# Patient Record
Sex: MALE | Race: BLACK OR AFRICAN AMERICAN
[De-identification: names, ages, dates, MRNs, and addresses within clinical notes are randomized per-mention and may not be internally consistent; named-entity substitution may affect disease eponyms.]

---

## 2022-04-10 ENCOUNTER — HOSPITAL ENCOUNTER (EMERGENCY)
Dept: HOSPITAL 97 - ER | Age: 27
Discharge: HOME | End: 2022-04-10
Payer: SELF-PAY

## 2022-04-10 VITALS — SYSTOLIC BLOOD PRESSURE: 109 MMHG | DIASTOLIC BLOOD PRESSURE: 50 MMHG | OXYGEN SATURATION: 96 %

## 2022-04-10 VITALS — TEMPERATURE: 97.5 F

## 2022-04-10 DIAGNOSIS — R11.2: ICD-10-CM

## 2022-04-10 DIAGNOSIS — Z91.048: ICD-10-CM

## 2022-04-10 DIAGNOSIS — Z91.030: ICD-10-CM

## 2022-04-10 DIAGNOSIS — Z91.013: ICD-10-CM

## 2022-04-10 DIAGNOSIS — A08.39: Primary | ICD-10-CM

## 2022-04-10 DIAGNOSIS — Z20.822: ICD-10-CM

## 2022-04-10 LAB
ALBUMIN SERPL BCP-MCNC: 4 G/DL (ref 3.4–5)
ALP SERPL-CCNC: 75 U/L (ref 45–117)
ALT SERPL W P-5'-P-CCNC: 128 U/L (ref 12–78)
AST SERPL W P-5'-P-CCNC: 74 U/L (ref 15–37)
BUN BLD-MCNC: 13 MG/DL (ref 7–18)
GLUCOSE SERPLBLD-MCNC: 153 MG/DL (ref 74–106)
HCT VFR BLD CALC: 48.6 % (ref 39.6–49)
LIPASE SERPL-CCNC: 874 U/L (ref 73–393)
LYMPHOCYTES # SPEC AUTO: 2.5 K/UL (ref 0.7–4.9)
PMV BLD: 7.5 FL (ref 7.6–11.3)
POTASSIUM SERPL-SCNC: 3.7 MMOL/L (ref 3.5–5.1)
RBC # BLD: 5.78 M/UL (ref 4.33–5.43)

## 2022-04-10 PROCEDURE — 85025 COMPLETE CBC W/AUTO DIFF WBC: CPT

## 2022-04-10 PROCEDURE — 74176 CT ABD & PELVIS W/O CONTRAST: CPT

## 2022-04-10 PROCEDURE — 80053 COMPREHEN METABOLIC PANEL: CPT

## 2022-04-10 PROCEDURE — 99284 EMERGENCY DEPT VISIT MOD MDM: CPT

## 2022-04-10 PROCEDURE — 93005 ELECTROCARDIOGRAM TRACING: CPT

## 2022-04-10 PROCEDURE — 83690 ASSAY OF LIPASE: CPT

## 2022-04-10 PROCEDURE — 36415 COLL VENOUS BLD VENIPUNCTURE: CPT

## 2022-04-10 PROCEDURE — 76705 ECHO EXAM OF ABDOMEN: CPT

## 2022-04-10 NOTE — ER
Nurse's Notes                                                                                     

 [facility]                                                                 

Name: James Cruz Jr                                                                              

Age: 27 yrs                                                                                       

Sex: Male                                                                                         

: 1995                                                                                   

MRN: [id number]                                                                                   

Arrival Date: 04/10/2022                                                                          

Time: 11:06                                                                                       

Account#: [id number]                                                                            

Bed 19                                                                                            

Private MD:                                                                                       

Diagnosis: Nausea with vomiting, unspecified;Epigastric abdominal tenderness;Other viral enteritis

                                                                                                  

Presentation:                                                                                     

04/10                                                                                             

11:10 Chief complaint: EMS states: Sudden onset of severe epigastric discomfort just prior to ss  

      arrival. Pt belched en route to ED and reports that his pain is significantly better,       

      2/10. Coronavirus screen: Client denies travel out of the [place] in the last 14 days.         

      Ebola Screen: Patient denies exposure to infectious person. Patient denies travel to an     

      Ebola-affected area in the 21 days before illness onset. Initial Sepsis Screen: Does        

      the patient meet any 2 criteria? No. Patient's initial sepsis screen is negative. Does      

      the patient have a suspected source of infection? No. Patient's initial sepsis screen       

      is negative. Risk Assessment: Do you want to hurt yourself or someone else? Patient         

      reports no desire to harm self or others. Onset of symptoms was April 10, 2022.             

11:10 Method Of Arrival: EMS: [facility]                                                       ss  

11:10 Acuity: JAQUAN 3                                                                           ss  

                                                                                                  

Historical:                                                                                       

- Allergies:                                                                                      

11:12 Bees;                                                                                   ss  

11:12 Iodine;                                                                                 ss  

11:12 SHELLFISH;                                                                              ss  

- Home Meds:                                                                                      

11:12 None [Active];                                                                          ss  

- PMHx:                                                                                           

11:12 Asthma;                                                                                 ss  

- PSHx:                                                                                           

11:12 None;                                                                                   ss  

                                                                                                  

- Immunization history:: Client reports having NOT received the Covid vaccine.                    

- Social history:: Smoking status: Reported history of juuling and/or vaping.                     

- Family history:: not pertinent.                                                                 

                                                                                                  

                                                                                                  

Screenin:33 Abuse screen: Denies threats or abuse. Denies injuries from another. Nutritional        ww  

      screening: No deficits noted. Tuberculosis screening: No symptoms or risk factors           

      identified. Fall Risk None identified.                                                      

                                                                                                  

Assessment:                                                                                       

11:32 General: Appears in no apparent distress. uncomfortable, Behavior is cooperative. Pain: ww  

      Complains of pain in epigastric area, right upper quadrant and left upper quadrant.         

      Neuro: Level of Consciousness is awake, alert, obeys commands, Oriented to person,          

      place, time, situation, Moves all extremities. Speech is normal. Cardiovascular:            

      Capillary refill < 3 seconds Patient's skin is warm and dry. Respiratory: Airway is         

      patent Respiratory effort is even, unlabored, Respiratory pattern is regular,               

      symmetrical. GI: Abdomen is non-distended, Reports indigestion, nausea. : No signs        

      and/or symptoms were reported regarding the genitourinary system. EENT: No signs and/or     

      symptoms were reported regarding the EENT system. Derm: No signs and/or symptoms            

      reported regarding the dermatologic system. Skin is intact, is healthy with good            

      turgor, Skin is pink, warm \T\ dry.                                                         

11:55 General: Appears uncomfortable, Behavior is anxious, crying. Neuro:. Cardiovascular:.   ww  

      Derm: Skin is diaphoretic.                                                                  

12:30 Reassessment: Patient and/or family updated on plan of care and expected duration. Pain ww  

      level reassessed. General: Appears in no apparent distress. Behavior is sleeping.           

13:19 Reassessment: Dr. Galindo at bedside reassessing patient at this time and discussing   ss  

      results/ plan of care.                                                                      

13:50 Reassessment: Patient appears in no apparent distress at this time. Patient and/or      ww  

      family updated on plan of care and expected duration. Pain level reassessed. Patient is     

      alert, oriented x 3, equal unlabored respirations, skin warm/dry/pink. General: Appears     

      in no apparent distress. comfortable, Behavior is calm, cooperative. Respiratory:           

      Airway is patent Respiratory effort is even, unlabored, Respiratory pattern is regular,     

      symmetrical.                                                                                

14:20 Reassessment: Patient appears in no apparent distress at this time. No changes from     ww  

      previously documented assessment. Patient and/or family updated on plan of care and         

      expected duration. Pain level reassessed. Patient is alert, oriented x 3, equal             

      unlabored respirations, skin warm/dry/pink.                                                 

                                                                                                  

Vital Signs:                                                                                      

11:10  / 79; Pulse 60; Resp 15; Temp 97.5(TE); Pulse Ox 97% on R/A; Height 6 ft. 1 in.  ss  

      (185.42 cm); Pain 2/10;                                                                     

13:45  / 49; Pulse 58; Resp 16; Pulse Ox 97% on R/A;                                    ww  

14:30  / 49; Pulse 76; Resp 16; Pulse Ox 98% on R/A;                                    ww  

15:03  / 50; Pulse 56; Resp 16; Pulse Ox 96% on R/A;                                    ww  

                                                                                                  

ED Course:                                                                                        

11:06 Patient arrived in ED.                                                                  ds1 

11:06 Anthony Galindo MD is Attending Physician.                                             rosa 

11:12 Triage completed.                                                                       ss  

11:12 Kerri Kirkpatrick, RN is Primary Nurse.                                                     ww  

11:12 Arm band placed on right wrist.                                                         ss  

11:31 Abdomen In Process Unspecified.                                                         EDMS

11:33 Patient has correct armband on for positive identification. Bed in low position. Call   ww  

      light in reach. Side rails up X 1. Adult w/ patient. Pulse ox on. NIBP on.                  

11:33 Maintain EMS IV. Dressing intact. Good blood return noted. Site clean \T\ dry. Gauge \T\    ww

      site: 20g left ac.                                                                          

12:10 Inserted saline lock: 20 gauge in right antecubital area, using aseptic technique.      ww  

13:10 US Abdomen Limited In Process Unspecified.                                              EDMS

13:31 Isiah Florez MD is Referral Physician.                                                  rosa 

15:04 No provider procedures requiring assistance completed. IV discontinued, bleeding        ww  

      controlled, No redness/swelling at site. Pressure dressing applied.                         

                                                                                                  

Administered Medications:                                                                         

11:15 Drug: NS 0.9% 1000 ml Route: IV; Rate: 1 bolus; Site: left antecubital;                 ww  

11:15 Drug: morphine 2 mg Route: IVP; Site: left antecubital;                                 ww  

11:18 Drug: Zofran (Ondansetron) 4 mg Route: IVP; Site: left antecubital;                     ww  

11:21 Drug: Pepcid (famotidine) 20 mg Route: IVP; Site: left antecubital;                     ww  

11:43 Drug: Dilaudid (HYDROmorphone) 1 mg Route: IVP; Site: left antecubital;                 jh6 

12:18 Drug: Reglan (metoCLOPramide) 10 mg Route: IVP; Site: right antecubital;                ww  

12:24 Drug: Ativan (LORazepam) 1 mg Route: IVP; Site: right antecubital;                      ww  

13:30 Drug: NS 0.9% 1000 ml Route: IV; Rate: 1 bolus; Site: right antecubital;                ww  

                                                                                                  

                                                                                                  

Outcome:                                                                                          

13:34 Discharge ordered by MD.                                                                rosa 

15:05 Patient left the ED.                                                                    ww  

                                                                                                  

Signatures:                                                                                       

Dispatcher [employer]                           EDAnthony Arce MD MD cha Sanford, Demi                                ds1                                                  

Maylin Khalil, RN                      RN   ss                                                   

Karina Mcdaniel, RN                   RN   jh6                                                  

Kerri Kirkpatrick, RN                       RN   ww                                                   

                                                                                                  

**************************************************************************************************

## 2022-04-10 NOTE — RAD REPORT
EXAM DESCRIPTION:  CTAbdomen   Pelvis Wo Contrast - 4/10/2022 11:30 am

 

CLINICAL HISTORY:

ABD PAIN

 

COMPARISON:  Abdomen   Pelvis W Contrast dated 3/16/2022; Abdomen   Pelvis W Contrast dated 2/13/2019
; Abdomen   Pelvis W Contrast dated 3/7/2017; CT ABD PELVIS W CONTRAST dated 5/23/2014No comparisons

 

TECHNIQUE:  CT of the abdomen and pelvis was performed.

 

All CT scans are performed using dose optimization technique as appropriate and may include automated
 exposure control or mA/KV adjustment according to patient size.

 

FINDINGS:  Lower chest: No acute abnormality.

Liver: No acute abnormality or suspicious lesions.

Biliary: No biliary ductal dilatation.

Stomach: No significant focal abnormality.

Duodenum: No significant focal abnormality.

Pancreas: No significant abnormality.

Spleen: No significant abnormality.

Adrenal: No suspicious lesions.

Kidney/ureter: No hydronephrosis. No renal calculi.

Retroperitoneum: No retroperitoneal adenopathy.

Vascular: No aneurysm.

Bowel: Multiple air-fluid levels are present within the small bowel. No transition point to suggest b
owel obstruction this time..

Peritoneum: Mesenteric edema cyst with a small bowel. Prominent mesenteric lymph nodes.

Bladder: Grossly unremarkable.

Reproductive: No adnexal masses.

Bones: No acute fracture.

Other: n/a

 

IMPRESSION:  Small air-fluid levels the small bowel and mesenteric edema without discrete transition 
point likely reflecting an enteritis. No bowel obstruction. Normal appendix.

## 2022-04-10 NOTE — EDPHYS
Physician Documentation                                                                           

 [facility]                                                                 

Name: James Cruz Jr                                                                              

Age: 27 yrs                                                                                       

Sex: Male                                                                                         

: 1995                                                                                   

MRN: [id number]                                                                                   

Arrival Date: 04/10/2022                                                                          

Time: 11:06                                                                                       

Account#: [id number]                                                                            

Bed 19                                                                                            

Private MD:                                                                                       

ED Physician Anthony Galindo                                                                      

HPI:                                                                                              

04/10                                                                                             

11:14 This 27 yrs old Black Male presents to ER via EMS with complaints of upper abdominal    rosa 

      pain this morning.                                                                          

11:14 The patient presents with abdominal pain in the epigastric area, in the upper abdomen.  rosa 

      Onset: The symptoms/episode began/occurred this morning, today. The patient presents to     

      the emergency department with nausea, that is moderate. Onset: The symptoms/episode         

      began/occurred this morning. Possible causes: unknown. The symptoms are aggravated by       

      nothing. The symptoms are alleviated by nothing. The symptoms do not radiate.               

      Associated signs and symptoms: The patient has no apparent associated signs or              

      symptoms. Associated signs and symptoms: Pertinent positives: nausea and vomiting.          

      Modifying factors: The symptoms are alleviated by nothing, the symptoms are aggravated      

      by nothing.                                                                                 

                                                                                                  

Historical:                                                                                       

- Allergies:                                                                                      

11:12 Bees;                                                                                   ss  

11:12 Iodine;                                                                                 ss  

11:12 SHELLFISH;                                                                              ss  

- Home Meds:                                                                                      

11:12 None [Active];                                                                          ss  

- PMHx:                                                                                           

11:12 Asthma;                                                                                 ss  

- PSHx:                                                                                           

11:12 None;                                                                                   ss  

                                                                                                  

- Immunization history:: Client reports having NOT received the Covid vaccine.                    

- Social history:: Smoking status: Reported history of juuling and/or vaping.                     

- Family history:: not pertinent.                                                                 

                                                                                                  

                                                                                                  

ROS:                                                                                              

11:14 Constitutional: Negative for fever, chills, and weight loss, Eyes: Negative for injury, rosa 

      pain, redness, and discharge, ENT: Negative for injury, pain, and discharge, Neck:          

      Negative for injury, pain, and swelling, Cardiovascular: Negative for chest pain,           

      palpitations, and edema, Respiratory: Negative for shortness of breath, cough,              

      wheezing, and pleuritic chest pain, Back: Negative for injury and pain, : Negative        

      for injury, bleeding, discharge, and swelling, MS/Extremity: Negative for injury and        

      deformity, Skin: Negative for injury, rash, and discoloration, Neuro: Negative for          

      headache, weakness, numbness, tingling, and seizure, Psych: Negative for depression,        

      anxiety, suicide ideation, homicidal ideation, and hallucinations, Allergy/Immunology:      

      Negative for hives, rash, and allergies, Endocrine: Negative for neck swelling,             

      polydipsia, polyuria, polyphagia, and marked weight changes, Hematologic/Lymphatic:         

      Negative for swollen nodes, abnormal bleeding, and unusual bruising.                        

11:14 Abdomen/GI: Positive for abdominal pain, nausea and vomiting, vomiting, of the              

      epigastric area, right upper quadrant and left upper quadrant.                              

                                                                                                  

Exam:                                                                                             

11:14 Constitutional:  This is a well developed, well nourished patient who is awake, alert,  rosa 

      and in no acute distress. Head/Face:  Normocephalic, atraumatic. Eyes:  Pupils equal        

      round and reactive to light, extra-ocular motions intact.  Lids and lashes normal.          

      Conjunctiva and sclera are non-icteric and not injected.  Cornea within normal limits.      

      Periorbital areas with no swelling, redness, or edema. ENT:  Nares patent. No nasal         

      discharge, no septal abnormalities noted.  Tympanic membranes are normal and external       

      auditory canals are clear.  Oropharynx with no redness, swelling, or masses, exudates,      

      or evidence of obstruction, uvula midline.  Mucous membranes moist. Neck:  Trachea          

      midline, no thyromegaly or masses palpated, and no cervical lymphadenopathy.  Supple,       

      full range of motion without nuchal rigidity, or vertebral point tenderness.  No            

      Meningismus. Chest/axilla:  Normal chest wall appearance and motion.  Nontender with no     

      deformity.  No lesions are appreciated. Cardiovascular:  Regular rate and rhythm with a     

      normal S1 and S2.  No gallops, murmurs, or rubs.  Normal PMI, no JVD.  No pulse             

      deficits. Respiratory:  Lungs have equal breath sounds bilaterally, clear to                

      auscultation and percussion.  No rales, rhonchi or wheezes noted.  No increased work of     

      breathing, no retractions or nasal flaring. Back:  No spinal tenderness.  No                

      costovertebral tenderness.  Full range of motion. Male :  Normal genitalia with no        

      discharge or lesions. Skin:  Warm, dry with normal turgor.  Normal color with no            

      rashes, no lesions, and no evidence of cellulitis. MS/ Extremity:  Pulses equal, no         

      cyanosis.  Neurovascular intact.  Full, normal range of motion. Neuro:  Awake and           

      alert, GCS 15, oriented to person, place, time, and situation.  Cranial nerves II-XII       

      grossly intact.  Motor strength 5/5 in all extremities.  Sensory grossly intact.            

      Cerebellar exam normal.  Normal gait. Psych:  Awake, alert, with orientation to person,     

      place and time.  Behavior, mood, and affect are within normal limits.                       

11:14 Respiratory: mild respiratory distress is noted,  Respirations: normal, no acute            

      changes, Breath sounds: are clear throughout.                                               

11:14 Abdomen/GI: Inspection: abdomen appears normal, Bowel sounds: normal, Palpation:            

      moderate abdominal tenderness, in the epigastric area and left upper quadrant, Liver:       

      no appreciated palpable abnormalities, Hernia: not appreciated.                             

12:16 ECG was reviewed by the Attending Physician.                                            [facility] 

                                                                                                  

Vital Signs:                                                                                      

11:10  / 79; Pulse 60; Resp 15; Temp 97.5(TE); Pulse Ox 97% on R/A; Height 6 ft. 1 in.  ss  

      (185.42 cm); Pain 2/10;                                                                     

13:45  / 49; Pulse 58; Resp 16; Pulse Ox 97% on R/A;                                    ww  

14:30  / 49; Pulse 76; Resp 16; Pulse Ox 98% on R/A;                                    ww  

15:03  / 50; Pulse 56; Resp 16; Pulse Ox 96% on R/A;                                    ww  

                                                                                                  

MDM:                                                                                              

11:07 Patient medically screened.                                                             [facility] 

11:19 Differential diagnosis: Nonspecific abd pain, gastritis, diverticulitis, viral          rosa 

      gastroenteritis, gastroenteritis, cholecystitis, Cholelithiasis, diverticulitis,            

      non-specific abd pain, pancreatitis, Peptic Ulcer Disease, urinary tract infection.         

      Data reviewed: vital signs, nurses notes, lab test result(s), radiologic studies, CT        

      scan, ultrasound. Data interpreted: Pulse oximetry: on room air is 97 %. Test               

      interpretation: by ED physician or midlevel provider: ECG, plain radiologic studies.        

      Counseling: I had a detailed discussion with the patient and/or guardian regarding: the     

      historical points, exam findings, and any diagnostic results supporting the                 

      discharge/admit diagnosis, lab results, radiology results.                                  

                                                                                                  

04/10                                                                                             

11:07 Order name: CBC with Diff; Complete Time: 11:40                                         [facility] 

04/10                                                                                             

11:07 Order name: CMP                                                                         [facility] 

04/10                                                                                             

11:07 Order name: Lipase                                                                      [facility] 

04/10                                                                                             

11:13 Order name: US Abdomen Limited                                                          [facility] 

04/10                                                                                             

11:51 Order name: COVID-19 SARS RT PCR (Document "Date of Onset" if Symptomatic)                

04/10                                                                                             

11:20 Order name: Abdomen ; Complete Time: 11:42                                              [facility]

04/10                                                                                             

11:07 Order name: IV Saline Lock; Complete Time: 11:24                                        [facility] 

04/10                                                                                             

11:07 Order name: Labs collected and sent; Complete Time: 11:24                               [facility] 

04/10                                                                                             

11:40 Order name: EKG; Complete Time: 11:40                                                   [facility] 

04/10                                                                                             

11:40 Order name: EKG - Nurse/Tech; Complete Time: 11:49                                      [facility]a 

04/10                                                                                             

11:41 Order name: Labs - recollect needed: recollect green top hemolyzed; Complete Time: 11:49eb  

                                                                                                  

EC:16 Rate is 82 beats/min. Rhythm is regular. QRS Axis is Normal. NJ interval is normal. QRS rosa 

      interval is normal. QT interval is normal. No Q waves. T waves are Normal. No ST            

      changes noted. Clinical impression: LVH and No evidence of ischemia. Interpreted by me.     

      Reviewed by me.                                                                             

                                                                                                  

Administered Medications:                                                                         

11:15 Drug: NS 0.9% 1000 ml Route: IV; Rate: 1 bolus; Site: left antecubital;                 ww  

11:15 Drug: morphine 2 mg Route: IVP; Site: left antecubital;                                 ww  

11:18 Drug: Zofran (Ondansetron) 4 mg Route: IVP; Site: left antecubital;                     ww  

11:21 Drug: Pepcid (famotidine) 20 mg Route: IVP; Site: left antecubital;                     ww  

11:43 Drug: Dilaudid (HYDROmorphone) 1 mg Route: IVP; Site: left antecubital;                 [facility] 

12:18 Drug: Reglan (metoCLOPramide) 10 mg Route: IVP; Site: right antecubital;                ww  

12:24 Drug: Ativan (LORazepam) 1 mg Route: IVP; Site: right antecubital;                      ww  

13:30 Drug: NS 0.9% 1000 ml Route: IV; Rate: 1 bolus; Site: right antecubital;                ww  

                                                                                                  

                                                                                                  

Disposition Summary:                                                                              

04/10/22 13:34                                                                                    

Discharge Ordered                                                                                 

      Location: Home                                                                          [place] 

      Problem: new                                                                            rosa 

      Symptoms: have improved                                                                 rosa 

      Condition: Stable                                                                       rosa 

      Diagnosis                                                                                   

        - Nausea with vomiting, unspecified                                                   rosa 

        - Epigastric abdominal tenderness                                                     rosa 

        - Other viral enteritis                                                               rosa 

      Followup:                                                                               rosa 

        - With: Private Physician                                                                  

        - When: 2 - 3 days                                                                         

        - Reason: Recheck today's complaints, Continuance of care, Re-evaluation by your           

      physician                                                                                   

      Followup:                                                                               [place]a 

        - With: Isiah Florez MD                                                                    

        - When: 2 - 3 days                                                                         

        - Reason: Recheck today's complaints, Re-evaluation by your physician                      

      Discharge Instructions:                                                                     

        - Discharge Summary Sheet                                                             rosa 

        - Abdominal Pain, Adult                                                               rosa 

        - Nausea and Vomiting, Adult                                                          rosa 

        - Nausea, Adult                                                                       rosa 

        - Acute Pancreatitis                                                                  rosa 

        - Acute Pancreatitis, Easy-to-Read                                                    rosa 

        - Abdominal Pain, Adult, Easy-to-Read                                                 rosa 

        - Alcohol Abuse and Nutrition                                                         rosa 

        - Nausea, Adult, Easy-to-Read                                                         rosa 

        - Vomiting, Adult                                                                     [facility] 

      Forms:                                                                                      

        - Medication Reconciliation Form                                                      [facility] 

        - Thank You Letter                                                                    [facility] 

        - Antibiotic Education                                                                [facility] 

        - Prescription Opioid Use                                                             [facility] 

      Prescriptions:                                                                              

        - Pepcid 20 mg Oral Tablet                                                                 

            - take 1 tablet by ORAL route every 12 hours for 15 days; 30 tablet; Refills: 0,  [facility] 

      Product Selection Permitted                                                                 

        - Zofran 4 mg Oral Tablet                                                                  

            - take 1 tablet by ORAL route every 12 hours As needed; 20 tablet; Refills: 0,    [facility] 

      Product Selection Permitted                                                                 

        - dicyclomine 20 mg Oral Tablet                                                            

            - take 1 tablet by ORAL route 4 times per day; 28 tablet; Refills: 0, Product     [facility] 

      Selection Permitted                                                                         

Signatures:                                                                                       

Dispatcher [employer]                           EDAnthony Arce MD MD cha Smirch, Shelby, RN RN   ss                                                   

Arielle Aguilar Jennifer, RN                   RN   jh                                                  

Kerri Kirkpatrick RN                       RN   ww                                                   

                                                                                                  

Corrections: (The following items were deleted from the chart)                                    

11:20 11:13 Abdomen Pelvis W [id number] ordered. [facility]                                     [facility]

                                                                                                  

**************************************************************************************************

## 2022-04-10 NOTE — RAD REPORT
EXAM DESCRIPTION:  US - Abdomen Exam Limited - 4/10/2022 1:08 pm

 

CLINICAL HISTORY:  ABD PAIN

 

COMPARISON:  Abdomen   Pelvis Wo Contrast dated 4/10/2022

 

FINDINGS:  The gallbladder demonstrates no gallstones. No pericholecystic fluid or gallbladder wall t
hickening. The common bile duct is normal measuring 2 mm.

 

The liver demonstrates no findings of intrahepatic biliary dilatation.

 

IMPRESSION:  Unremarkable examination.

## 2022-04-11 NOTE — EKG
Test Date:    2022-04-10               Test Time:    11:43:35

Technician:   [place]                                     

                                                     

MEASUREMENT RESULTS:                                       

Intervals:                                           

Rate:         83                                     

IL:           150                                    

QRSD:         92                                     

QT:           376                                    

QTc:          441                                    

Axis:                                                

P:            51                                     

IL:           150                                    

QRS:          -29                                    

T:            7                                      

                                                     

INTERPRETIVE STATEMENTS:                                       

                                                     

Normal sinus rhythm with sinus arrhythmia

Moderate voltage criteria for LVH, may be normal variant

Nonspecific ST abnormality

Abnormal ECG

Compared to ECG 04/10/2022 11:42:57

ST (T wave) deviation now present

T-wave abnormality no longer present

Prolonged QT interval no longer present



Electronically Signed On 04-11-22 09:35:39 CDT by Darci Fletcher

## 2022-09-13 NOTE — EKG
"Chief Complaint   Patient presents with     Hypertension       Initial /64 (BP Location: Left arm, Patient Position: Sitting, Cuff Size: Adult Regular)   Pulse 88   Temp 97.4  F (36.3  C) (Tympanic)   Ht 1.6 m (5' 3\")   Wt 52.2 kg (115 lb)   SpO2 100%   BMI 20.37 kg/m   Estimated body mass index is 20.37 kg/m  as calculated from the following:    Height as of this encounter: 1.6 m (5' 3\").    Weight as of this encounter: 52.2 kg (115 lb).  Medication Reconciliation: complete  Grace Styles LPN  " Test Date:    2022-04-10               Test Time:    11:42:57

Technician:   [place]                                     

                                                     

MEASUREMENT RESULTS:                                       

Intervals:                                           

Rate:         82                                     

PA:           144                                    

QRSD:         98                                     

QT:           400                                    

QTc:          467                                    

Axis:                                                

P:            39                                     

PA:           144                                    

QRS:          -28                                    

T:            7                                      

                                                     

INTERPRETIVE STATEMENTS:                                       

                                                     

Sinus rhythm with marked sinus arrhythmia

Minimal voltage criteria for LVH, may be normal variant

Nonspecific T wave abnormality

Prolonged QT

Abnormal ECG

No previous ECG available for comparison



Electronically Signed On 04-11-22 09:35:41 CDT by Darci Fletcher none

## 2023-01-08 ENCOUNTER — HOSPITAL ENCOUNTER (EMERGENCY)
Dept: HOSPITAL 97 - ER | Age: 28
Discharge: HOME | End: 2023-01-08
Payer: SELF-PAY

## 2023-01-08 VITALS — DIASTOLIC BLOOD PRESSURE: 66 MMHG | OXYGEN SATURATION: 98 % | SYSTOLIC BLOOD PRESSURE: 116 MMHG

## 2023-01-08 VITALS — TEMPERATURE: 97.7 F

## 2023-01-08 DIAGNOSIS — Z91.030: ICD-10-CM

## 2023-01-08 DIAGNOSIS — Z91.013: ICD-10-CM

## 2023-01-08 DIAGNOSIS — Z91.048: ICD-10-CM

## 2023-01-08 DIAGNOSIS — R10.13: Primary | ICD-10-CM

## 2023-01-08 LAB
ALBUMIN SERPL BCP-MCNC: 3.9 G/DL (ref 3.4–5)
ALP SERPL-CCNC: 75 U/L (ref 45–117)
ALT SERPL W P-5'-P-CCNC: 68 U/L (ref 16–61)
AST SERPL W P-5'-P-CCNC: 38 U/L (ref 15–37)
BUN BLD-MCNC: 14 MG/DL (ref 7–18)
GLUCOSE SERPLBLD-MCNC: 100 MG/DL (ref 74–106)
HCT VFR BLD CALC: 46.7 % (ref 39.6–49)
LIPASE SERPL-CCNC: 212 U/L (ref 73–393)
LYMPHOCYTES # SPEC AUTO: 2.6 K/UL (ref 0.7–4.9)
MCV RBC: 83.7 FL (ref 80–100)
PMV BLD: 7.5 FL (ref 7.6–11.3)
POTASSIUM SERPL-SCNC: 3.8 MMOL/L (ref 3.5–5.1)
RBC # BLD: 5.58 M/UL (ref 4.33–5.43)

## 2023-01-08 PROCEDURE — 85025 COMPLETE CBC W/AUTO DIFF WBC: CPT

## 2023-01-08 PROCEDURE — 83690 ASSAY OF LIPASE: CPT

## 2023-01-08 PROCEDURE — 99284 EMERGENCY DEPT VISIT MOD MDM: CPT

## 2023-01-08 PROCEDURE — 96361 HYDRATE IV INFUSION ADD-ON: CPT

## 2023-01-08 PROCEDURE — 74176 CT ABD & PELVIS W/O CONTRAST: CPT

## 2023-01-08 PROCEDURE — 96374 THER/PROPH/DIAG INJ IV PUSH: CPT

## 2023-01-08 PROCEDURE — 80053 COMPREHEN METABOLIC PANEL: CPT

## 2023-01-08 PROCEDURE — 36415 COLL VENOUS BLD VENIPUNCTURE: CPT

## 2023-01-08 PROCEDURE — 96375 TX/PRO/DX INJ NEW DRUG ADDON: CPT

## 2023-01-08 NOTE — EDPHYS
Physician Documentation                                                                           

 [facility]                                                                 

Name: James Cruz Jr                                                                              

Age: 27 yrs                                                                                       

Sex: Male                                                                                         

: 1995                                                                                   

MRN: [id number]                                                                                   

Arrival Date: 2023                                                                          

Time: 12:11                                                                                       

Account#: [id number]                                                                            

Bed 24                                                                                            

Private MD:                                                                                       

ED Physician Arnoldo De Anda                                                                         

HPI:                                                                                              

                                                                                             

15:36 This 27 yrs old Black Male presents to ER via Wheelchair with complaints of Abdominal   jmm 

      Pain.                                                                                       

15:36 The patient presents with abdominal pain. Onset: The symptoms/episode began/occurred    jmm 

      gradually, 5 day(s) ago. The symptoms do not radiate. Associated signs and symptoms:        

      Pertinent positives: diarrhea. Is a 27-year-old male with history of asthma the             

      presents emerged department with complaints of epigastric abdominal pain began              

      approximately 5 days ago. Symptoms initially began as multiple episodes of diarrhea.        

      States that the pain intensified today. Denies any vomiting but states being nauseous.      

      Denies any surgical abdominal history..                                                     

                                                                                                  

Historical:                                                                                       

- Allergies:                                                                                      

12:16 Bees;                                                                                   hb  

12:16 Iodine;                                                                                 hb  

12:16 SHELLFISH;                                                                              hb  

- PMHx:                                                                                           

12:16 Asthma;                                                                                 hb  

                                                                                                  

- Immunization history:: Adult Immunizations unknown, Last tetanus immunization:                  

  unknown.                                                                                        

- Social history:: Smoking status: Patient denies any tobacco usage or history of.                

                                                                                                  

                                                                                                  

ROS:                                                                                              

15:36 Constitutional: Negative for fever, chills, and weight loss, Cardiovascular: Negative   jmm 

      for chest pain, palpitations, and edema, Respiratory: Negative for shortness of breath,     

      cough, wheezing, and pleuritic chest pain.                                                  

15:36 Abdomen/GI: Positive for abdominal pain, diarrhea.                                          

15:36 All other systems are negative.                                                             

                                                                                                  

Exam:                                                                                             

15:36 Constitutional:  This is a well developed, well nourished patient who is awake, alert,  jmm 

      and in no acute distress. Head/Face:  atraumatic. Eyes:  EOMI, no conjunctival erythema     

      appreciated ENT:  Moist Mucus Membranes Neck:  Trachea midline, Supple Chest/axilla:        

      Normal chest wall appearance and motion.   Cardiovascular:  Regular rate and rhythm.        

      No edema appreciated Respiratory:  Normal respirations, no respiratory distress             

      appreciated                                                                                 

15:36 Back:  Normal ROM Skin:  General appearance color normal MS/ Extremity:  Moves all          

      extremities, no obvious deformities appreciated, no edema noted to the lower                

      extremities  Neuro:  Awake and alert Psych:  Behavior is normal, Mood is normal,            

      Patient is cooperative and pleasant                                                         

15:36 Abdomen/GI: Inspection: abdomen appears normal, Bowel sounds: normal, Palpation: soft,      

      mild abdominal tenderness, in the epigastric area.                                          

                                                                                                  

Vital Signs:                                                                                      

12:14 BP 97 / 65; Pulse 55; Resp 16; Temp 97.7; Pulse Ox 100% on R/A; Weight 122.47 kg;       hb  

      Height 6 ft. 1 in. (185.42 cm); Pain 7/10;                                                  

13:45  / 74; Pulse 56; Resp 18; Pulse Ox 99% ;                                          kb3 

15:00  / 66; Pulse 63; Resp 18; Pulse Ox 98% ;                                          kb3 

12:14 Body Mass Index 35.62 (122.47 kg, 185.42 cm)                                            hb  

                                                                                                  

MDM:                                                                                              

12:26 Patient medically screened.                                                             [facility] 

15:37 Data reviewed: vital signs, nurses notes. Counseling: I had a detailed discussion with  [facility] 

      the patient and/or guardian regarding: the historical points, exam findings, and any        

      diagnostic results supporting the discharge/admit diagnosis, lab results, radiology         

      results, the need for outpatient follow up, to return to the emergency department if        

      symptoms worsen or persist or if there are any questions or concerns that arise at          

      home. Response to treatment: the patient's symptoms have markedly improved after            

      treatment, and as a result, I will discharge patient. ED course: Patient given early        

      appendicitis return precautions. Patient understood and agrees plan of care..               

                                                                                                  

                                                                                             

12:27 Order name: CBC with Diff; Complete Time: 13:14                                         [facility] 

                                                                                             

12:27 Order name: CMP; Complete Time: 13:30                                                   [facility] 

                                                                                             

12:27 Order name: Lipase; Complete Time: 13:30                                                [facility] 

                                                                                             

13:22 Order name: CT Abd/Pelvis - Without Contrast; Complete Time: 14:23                      [facility] 

                                                                                             

12:27 Order name: IV Saline Lock; Complete Time: 13:45                                        [facility] 

                                                                                             

12:27 Order name: Labs collected and sent; Complete Time: 13:06                               [facility] 

                                                                                                  

Administered Medications:                                                                         

13:45 Drug: Zofran (Ondansetron) 4 mg Route: IVP; Site: right hand;                           jl7 

14:40 Follow up: Response: No adverse reaction                                                [facility] 

13:45 Drug: morphine 4 mg Route: IVP; Infused Over: 4 mins; Site: right hand;                 jl7 

14:40 Follow up: Response: No adverse reaction; Pain is decreased                             kb3 

13:45 Drug: Lactated Ringers Solution 1000 ml Route: IV; Rate: 1000 bolus; Site: right hand;  jl7 

14:40 Follow up: Response: No adverse reaction; IV Status: Completed infusion; IV Intake:     kb3 

      1000ml                                                                                      

14:53 Drug: GI Cocktail without Donnatal - (Maalox Suspension 30 ml, Lidocaine Liquid 2 % 15  kb3 

      ml) Route: PO;                                                                              

15:53 Follow up: Response: No adverse reaction; Pain is decreased                             kb3 

                                                                                                  

                                                                                                  

Disposition:                                                                                      

16:22 Co-signature as Attending Physician, Arnoldo De Anda MD.                                    rn  

                                                                                                  

Disposition Summary:                                                                              

23 15:39                                                                                    

Discharge Ordered                                                                                 

      Location: Home                                                                          [facility] 

      Condition: Stable                                                                       [facility] 

      Diagnosis                                                                                   

        - Epigastric pain                                                                     [facility] 

      Followup:                                                                               [facility] 

        - With: Abdelrahman Babcock MD                                                                

        - When: 2 - 3 days                                                                         

        - Reason: Recheck today's complaints, Continuance of care, Re-evaluation by your           

      physician                                                                                   

      Followup:                                                                               [facility] 

        - With: Isiah Florez MD                                                                    

        - When: 2 - 3 days                                                                         

        - Reason: Recheck today's complaints, Continuance of care, Re-evaluation by your           

      physician                                                                                   

      Followup:                                                                               jmm 

        - With: Kenan Gardner MD                                                                

        - When: 2 - 3 days                                                                         

        - Reason: Recheck today's complaints, Continuance of care, Re-evaluation by your           

      physician                                                                                   

      Discharge Instructions:                                                                     

        - Discharge Summary Sheet                                                             [facility] 

        - Abdominal Pain, Adult                                                               [facility] 

      Forms:                                                                                      

        - Medication Reconciliation Form                                                      [facility] 

        - Thank You Letter                                                                    [facility] 

        - Antibiotic Education                                                                [facility] 

        - Prescription Opioid Use                                                             [facility] 

        - Work release form                                                                   kb3 

      Prescriptions:                                                                              

        - Carafate 1 gram Oral Tablet                                                              

            - take 1 tablet by ORAL route 4 times per day take on an empty stomach, beginning jmm 

      on waking and last dose at bedtime; 100 tablet; Refills: 0, Product Selection Permitted     

        - Pepcid 20 mg Oral Tablet                                                                 

            - take 1 tablet by ORAL route every 12 hours for 10 days; 20 tablet; Refills: 0,  [facility] 

      Product Selection Permitted                                                                 

        - dicyclomine 20 mg Oral Tablet                                                            

            - take 1 tablet by ORAL route 4 times per day; 30 tablet; Refills: 0, Product     [facility] 

      Selection Permitted                                                                         

        - ondansetron 4 mg Oral                                                                    

            - take 4 milligrams by SUBLINGUAL route every 4-6 hours As needed; 20 tablet;     [facility] 

      Refills: 0, Product Selection Permitted                                                     

Signatures:                                                                                       

Dispatcher [employer]                           Hubert Stephens PA PA jmm Nieto, Roman, MD MD   rn                                                   

Kayley Dueñas RN                     RN                                                      

Derrick Phillips RN                        RN   jl7                                                  

Delilah Soto RN                    RN   kb3                                                  

                                                                                                  

**************************************************************************************************

## 2023-01-08 NOTE — ER
Nurse's Notes                                                                                     

 [facility]                                                                 

Name: James Cruz Jr                                                                              

Age: 27 yrs                                                                                       

Sex: Male                                                                                         

: 1995                                                                                   

MRN: [id number]                                                                                   

Arrival Date: 2023                                                                          

Time: 12:11                                                                                       

Account#: [id number]                                                                            

Bed 24                                                                                            

Private MD:                                                                                       

Diagnosis: Epigastric pain                                                                        

                                                                                                  

Presentation:                                                                                     

                                                                                             

12:14 Chief complaint: Diarrhea x 5 days, upper abdominal pain since this morning.            hb  

      Coronavirus screen: At this time, the client does not indicate any symptoms associated      

      with coronavirus-19. Ebola Screen: No symptoms or risks identified at this time.            

      Initial Sepsis Screen: Does the patient meet any 2 criteria? No. Patient's initial          

      sepsis screen is negative. Does the patient have a suspected source of infection? No.       

      Patient's initial sepsis screen is negative. Risk Assessment: Do you want to hurt           

      yourself or someone else? Patient reports no desire to harm self or others. Onset of        

      symptoms was 2023.                                                              

12:14 Method Of Arrival: Wheelchair                                                           hb  

12:14 Acuity: JAQUAN 3                                                                           hb  

                                                                                                  

Historical:                                                                                       

- Allergies:                                                                                      

12:16 Bees;                                                                                   hb  

12:16 Iodine;                                                                                 hb  

12:16 SHELLFISH;                                                                              hb  

- PMHx:                                                                                           

12:16 Asthma;                                                                                 hb  

                                                                                                  

- Immunization history:: Adult Immunizations unknown, Last tetanus immunization:                  

  unknown.                                                                                        

- Social history:: Smoking status: Patient denies any tobacco usage or history of.                

                                                                                                  

                                                                                                  

Screenin:30 [facility] ED Fall Risk Assessment (Adult) History of falling in the last 3 months,       kb3 

      including since admission No falls in past 3 months (0 pts) Confusion or Disorientation     

      No (0 pts) Intoxicated or Sedated No (0 pts) Impaired Gait No (0 pts) Mobility Assist       

      Device Used No (0 pt) Altered Elimination No (0 pt) Score/Fall Risk Level 0 - 2 = Low       

      Risk Oriented to surroundings, Maintained a safe environment, Educated pt \T\ family on     

      fall prevention, incl call for assistance when getting out of bed, Assessed \T\             

      reinforced patient's understanding of fall precautions, Provided non-skid footwear,         

      Hourly rounding (assess needs \T\ fall precautionary measures) done, Used ambulatory aids   

      as needed (educated on \T\ assisted with), Used gait belt as appropriate. Abuse screen:     

      Denies threats or abuse. Denies injuries from another. Nutritional screening: No            

      deficits noted. Tuberculosis screening: No symptoms or risk factors identified.             

                                                                                                  

Assessment:                                                                                       

12:30 General: Appears in no apparent distress. Behavior is calm, cooperative. Pain:          kb3 

      Complains of pain in epigastric area Pain does not radiate. Pain currently is 7 out of      

      10 on a pain scale. Quality of pain is described as crampy, sharp, Pain began 4 hours       

      ago. Also complains of Diarrhea.                                                            

12:30 GI: Bowel sounds present X 4 quads. Abd is soft Abdomen is tender to palpation in       kb3 

      epigastric area, right upper quadrant and left upper quadrant Reports upper abdominal       

      pain, diarrhea.                                                                             

15:30 Reassessment: Patient appears in no apparent distress at this time. General: Pt reports kb3 

      feeling better, No episodes of diarrhea since arrival.                                      

                                                                                                  

Vital Signs:                                                                                      

12:14 BP 97 / 65; Pulse 55; Resp 16; Temp 97.7; Pulse Ox 100% on R/A; Weight 122.47 kg;       hb  

      Height 6 ft. 1 in. (185.42 cm); Pain 7/10;                                                  

13:45  / 74; Pulse 56; Resp 18; Pulse Ox 99% ;                                          kb3 

15:00  / 66; Pulse 63; Resp 18; Pulse Ox 98% ;                                          kb3 

12:14 Body Mass Index 35.62 (122.47 kg, 185.42 cm)                                            hb  

                                                                                                  

ED Course:                                                                                        

12:11 Patient arrived in ED.                                                                  as  

12:16 Triage completed.                                                                       hb  

12:16 Hubert Mccauley PA is PHCP.                                                              [facility] 

12:16 Arnoldo De Anda MD is Attending Physician.                                                [facility] 

12:16 Arm band placed on.                                                                     hb  

12:30 No provider procedures requiring assistance completed.                                  kb3 

12:49 Patient has correct armband on for positive identification. Bed in low position. Call   mm9 

      light in reach. Warm blanket given. Pulse ox on. NIBP on.                                   

13:00 Missed attempt(s): 22 gauge in left antecubital area.                                   mm9 

13:06 CBC with Diff Sent.                                                                     mm9 

13:06 CMP Sent.                                                                               mm9 

13:06 Lipase Sent.                                                                            mm9 

13:06 Initial lab(s) drawn, by me, sent to lab.                                               mm9 

13:30 Missed attempt(s): 20 gauge in right antecubital area. Bleeding controlled, band aid    jl7 

      applied, catheter tip intact.                                                               

13:32 Delilah Soto, RN is Primary Nurse.                                                  kb3 

13:32 Missed attempt(s): 22 gauge in right antecubital area. Bleeding controlled, band aid    jl7 

      applied, catheter tip intact.                                                               

13:38 Missed attempt(s): 22 gauge in right forearm. Bleeding controlled, band aid applied,    jl7 

      catheter tip intact.                                                                        

13:40 Inserted saline lock: 22 gauge in right hand, using aseptic technique. ,using aseptic   jl7 

      technique. Inserted per pt request.                                                         

14:00 Patient moved to CT via wheelchair.                                                     kb3 

14:10 CT Abd/Pelvis - Without Contrast In Process Unspecified.                                EDMS

14:16 Patient moved back from CT.                                                             kb3 

15:38 Abdelrahman Babcock MD is Referral Physician.                                              jmm 

15:38 Isiah Florez MD is Referral Physician.                                                  jmm 

15:39 Kenan Gardner MD is Referral Physician.                                              [facility] 

15:53 IV discontinued, intact, bleeding controlled, No redness/swelling at site. Pressure     kb3 

      dressing applied.                                                                           

                                                                                                  

Administered Medications:                                                                         

13:45 Drug: Zofran (Ondansetron) 4 mg Route: IVP; Site: right hand;                           jl7 

14:40 Follow up: Response: No adverse reaction                                                kb3 

13:45 Drug: morphine 4 mg Route: IVP; Infused Over: 4 mins; Site: right hand;                 jl7 

14:40 Follow up: Response: No adverse reaction; Pain is decreased                             kb3 

13:45 Drug: Lactated Ringers Solution 1000 ml Route: IV; Rate: 1000 bolus; Site: right hand;  jl7 

14:40 Follow up: Response: No adverse reaction; IV Status: Completed infusion; IV Intake:     kb3 

      1000ml                                                                                      

14:53 Drug: GI Cocktail without Donnatal - (Maalox Suspension 30 ml, Lidocaine Liquid 2 % 15  kb3 

      ml) Route: PO;                                                                              

15:53 Follow up: Response: No adverse reaction; Pain is decreased                             kb3 

                                                                                                  

                                                                                                  

Medication:                                                                                       

12:30 VIS not applicable for this client.                                                     kb3 

                                                                                                  

Intake:                                                                                           

14:40 IV: 1000ml; Total: 1000ml.                                                              kb3 

                                                                                                  

Outcome:                                                                                          

15:39 Discharge ordered by MD.                                                                jm 

15:50 Discharged to home ambulatory, with family.                                             kb3 

15:50 Condition: stable                                                                           

15:50 Discharge instructions given to patient, family, Instructed on discharge instructions,      

      the need for admit, medication usage, Demonstrated understanding of instructions,           

      follow-up care, medications, Prescriptions given X 4.                                       

16:10 Patient left the ED.                                                                    kb3 

                                                                                                  

Signatures:                                                                                       

Dispatcher [employer]                           EDMS                                                 

Hubert Mccauley PA PA jmm Martinez, Amelia as Baxter, Heather RN                     RN                                                      

Derrick Phillips RN RN   jl7                                                  

Delilah Soto RN                    RN   kb3                                                  

Jahaira Wise                              mm9                                                  

                                                                                                  

Corrections: (The following items were deleted from the chart)                                    

14:08 14:06 General: Appears in no apparent distress. Behavior is calm, cooperative, kb3      kb3 

14:08 14:06 Pain: Complains of pain in epigastric area kb3                                    kb3 

                                                                                                  

**************************************************************************************************

## 2023-01-08 NOTE — RAD REPORT
EXAM DESCRIPTION:  CT - Abdomen   Pelvis Wo Contrast - 1/8/2023 2:09 pm

 

CLINICAL HISTORY:  Abdominal pain.

epigastric pain

 

COMPARISON:  Abdomen   Pelvis Wo Contrast dated 4/10/2022

 

TECHNIQUE:  CT imaging of the abdomen and pelvis was performed without contrast. Solid organ, bowel a
nd vascular assessment is limited due to lack of IV and oral contrast.

 

All CT scans are performed using dose optimization technique as appropriate and may include automated
 exposure control or mA/KV adjustment according to patient size.

 

FINDINGS:  The lower lung fields are clear.

 

The liver, spleen, pancreas, adrenal glands and kidneys are within normal limits for a limited non-co
ntrast examination.

 

No bowel obstruction, free air, free fluid or abscess.  The appendix is normal.

 

The osseous structures are within normal limits.

 

IMPRESSION:  No acute intra-abdominal or pelvic findings.

 

A limited non-contrast examination was performed as detailed.

## 2024-10-14 ENCOUNTER — HOSPITAL ENCOUNTER (EMERGENCY)
Dept: HOSPITAL 97 - ER | Age: 29
Discharge: HOME | End: 2024-10-14
Payer: SELF-PAY

## 2024-10-14 VITALS — OXYGEN SATURATION: 97 % | DIASTOLIC BLOOD PRESSURE: 58 MMHG | SYSTOLIC BLOOD PRESSURE: 124 MMHG

## 2024-10-14 VITALS — TEMPERATURE: 98.2 F

## 2024-10-14 DIAGNOSIS — E11.65: ICD-10-CM

## 2024-10-14 DIAGNOSIS — K29.00: Primary | ICD-10-CM

## 2024-10-14 LAB
ALBUMIN SERPL BCP-MCNC: 3.8 G/DL (ref 3.4–5)
ALBUMIN/GLOB SERPL: 0.9 {RATIO} (ref 1.1–1.8)
ALP SERPL-CCNC: 78 U/L (ref 45–117)
ALT SERPL W P-5'-P-CCNC: 33 U/L (ref 16–61)
ANION GAP SERPL CALC-SCNC: 8.5 MEQ/L (ref 5–15)
AST SERPL W P-5'-P-CCNC: 20 U/L (ref 15–37)
BUN BLD-MCNC: 15 MG/DL (ref 7–18)
GLOBULIN SER CALC-MCNC: 4.2 G/DL (ref 2.3–3.5)
GLUCOSE SERPLBLD-MCNC: 300 MG/DL (ref 74–106)
HCT VFR BLD CALC: 52.2 % (ref 39.6–49)
HGB BLD-MCNC: 17.7 G/DL (ref 13.6–17.9)
LIPASE SERPL-CCNC: 21 U/L (ref 13–75)
LYMPHOCYTES # SPEC AUTO: 1.9 K/UL (ref 0.7–4.9)
MCH RBC QN AUTO: 28.1 PG (ref 27–35)
MCHC RBC AUTO-ENTMCNC: 33.9 G/DL (ref 32–36)
MCV RBC: 83.1 FL (ref 80–100)
NRBC # BLD: 0 10*3/UL (ref 0–0)
NRBC BLD AUTO-RTO: 0.4 % (ref 0–0)
PMV BLD: 8.2 FL (ref 7.6–11.3)
POTASSIUM SERPL-SCNC: 3.5 MEQ/L (ref 3.5–5.1)
RBC # BLD: 6.28 M/UL (ref 4.33–5.43)
WBC # BLD AUTO: 6.5 THOU/UL (ref 4.3–10.9)

## 2024-10-14 PROCEDURE — 76705 ECHO EXAM OF ABDOMEN: CPT

## 2024-10-14 PROCEDURE — 36415 COLL VENOUS BLD VENIPUNCTURE: CPT

## 2024-10-14 PROCEDURE — 99284 EMERGENCY DEPT VISIT MOD MDM: CPT

## 2024-10-14 PROCEDURE — 85025 COMPLETE CBC W/AUTO DIFF WBC: CPT

## 2024-10-14 PROCEDURE — 83690 ASSAY OF LIPASE: CPT

## 2024-10-14 PROCEDURE — 96361 HYDRATE IV INFUSION ADD-ON: CPT

## 2024-10-14 PROCEDURE — 93005 ELECTROCARDIOGRAM TRACING: CPT

## 2024-10-14 PROCEDURE — 80053 COMPREHEN METABOLIC PANEL: CPT

## 2024-10-14 PROCEDURE — 96374 THER/PROPH/DIAG INJ IV PUSH: CPT

## 2024-10-14 NOTE — ER
Nurse's Notes                                                                                     

 [facility]                                                                 

Name: James Cruz Jr                                                                              

Age: 29 yrs                                                                                       

Sex: Male                                                                                         

: 1995                                                                                   

MRN: [id number]                                                                                   

Arrival Date: 10/14/2024                                                                          

Time: 05:31                                                                                       

Account#: [id number]                                                                            

Bed 15                                                                                            

Private MD:                                                                                       

Diagnosis: Epigastric pain;Acute gastritis without bleeding;Hyperglycemia, unspecified;Type 2     

  diabetes mellitus with hyperglycemia-new onset                                                  

                                                                                                  

Presentation:                                                                                     

10/14                                                                                             

05:46 Chief complaint: Patient states: epigastric pain X1 HR. Coronavirus screen: Client      lg3 

      denies travel out of the [place] in the last 14 days. At this time, the client does not        

      indicate any symptoms associated with coronavirus-19. Ebola Screen: No symptoms or          

      risks identified at this time. Initial Sepsis Screen: Does the patient meet any 2           

      criteria? No. Patient's initial sepsis screen is negative. Does the patient have a          

      suspected source of infection? No. Patient's initial sepsis screen is negative. Risk        

      Assessment: Do you want to hurt yourself or someone else? Patient reports no desire to      

      harm self or others. Onset of symptoms was 2024.                                

05:46 Method Of Arrival: Ambulatory                                                           lg3 

05:46 Acuity: JAQUAN 3                                                                           lg3 

                                                                                                  

Triage Assessment:                                                                                

05:47 General: Appears in no apparent distress. comfortable, Behavior is calm, cooperative.   lg3 

      Pain: Complains of pain in epigastric area Pain does not radiate. EENT: No deficits         

      noted. No signs and/or symptoms were reported regarding the EENT system. Neuro: No          

      deficits noted. Zafar Agitation-Sedation Scale (RASS): 0 - Alert and Calm Level of       

      Consciousness is awake, alert, obeys commands, Oriented to person, place, time,             

      situation. Cardiovascular: No deficits noted. Denies chest pain, shortness of breath,       

      Capillary refill < 3 seconds Clubbing of nail beds is absent JVD is absent Patient's        

      skin is warm and dry. Respiratory: No deficits noted. Airway is patent Respiratory          

      effort is even, unlabored, Respiratory pattern is regular, symmetrical. GI: Abdomen is      

      round non-distended, Reports upper abdominal pain, gaseousness. : No deficits noted.      

      No signs and/or symptoms were reported regarding the genitourinary system. Derm: No         

      deficits noted. No signs and/or symptoms reported regarding the dermatologic system.        

      Skin is intact, is healthy with good turgor, Skin is dry, Skin is normal, Skin              

      temperature is warm. Musculoskeletal: No deficits noted. No signs and/or symptoms           

      reported regarding the musculoskeletal system. Circulation, motion, and sensation           

      intact. Range of motion: intact in all extremities.                                         

                                                                                                  

Historical:                                                                                       

- Allergies:                                                                                      

05:47 Bees;                                                                                   lg3 

05:47 Iodine;                                                                                 lg3 

05:47 SHELLFISH;                                                                              lg3 

- Home Meds:                                                                                      

05:47 None [Active];                                                                          lg3 

- PMHx:                                                                                           

05:47 Asthma;                                                                                 lg3 

- PSHx:                                                                                           

05:47 None;                                                                                   lg3 

                                                                                                  

- Immunization history:: Adult Immunizations up to date.                                          

- Infectious Disease History:: Denies.                                                            

- Social history:: Smoking status: Reported history of juuling and/or vaping.                     

  Patient/guardian denies using alcohol, street drugs.                                            

- Family history:: not pertinent.                                                                 

                                                                                                  

                                                                                                  

Screenin:00 [facility] ED Fall Risk Assessment (Adult) History of falling in the last 3 months,       kj2 

      including since admission No falls in past 3 months (0 pts) Confusion or Disorientation     

      No (0 pts) Intoxicated or Sedated No (0 pts) Impaired Gait No (0 pts) Mobility Assist       

      Device Used No (0 pt) Altered Elimination No (0 pt) Score/Fall Risk Level 0 - 2 = Low       

      Risk Maintained a safe environment, Hourly rounding (assess needs \T\ fall precautionary    

      measures) done. Abuse screen: Denies threats or abuse. Denies injuries from another.        

      Nutritional screening: No deficits noted. Tuberculosis screening: No symptoms or risk       

      factors identified.                                                                         

                                                                                                  

Assessment:                                                                                       

06:00 General: Appears in no apparent distress. uncomfortable, Behavior is calm, cooperative. kj2 

      Pain: Complains of pain in epigastric area Pain currently is 8 out of 10 on a pain          

      scale. Neuro: Level of Consciousness is awake, alert, obeys commands, Oriented to           

      person, place, time, situation. Cardiovascular: Patient's skin is warm and dry.             

      Respiratory: Airway is patent Respiratory effort is even, unlabored. GI: Reports            

      epigastric pain. : No signs and/or symptoms were reported regarding the genitourinary     

      system.                                                                                     

06:00 GI: Abdomen is tender to palpation in epigastric area.                                  kj2 

07:15 Reassessment: Patient appears in no apparent distress at this time. No changes from     kc6 

      previously documented assessment. Patient and/or family updated on plan of care and         

      expected duration. Pain level reassessed. Patient is alert, oriented x 3, equal             

      unlabored respirations, skin warm/dry/pink. Patient states feeling better. Patient          

      states symptoms have improved.                                                              

                                                                                                  

Vital Signs:                                                                                      

05:46  / 94; Pulse 70; Resp 17 S; Temp 98.3(O); Pulse Ox 98% on R/A; Weight 108.86 kg   lg3 

      (R); Height 5 ft. 11 in. (R); Pain 7/10;                                                    

06:00  / 68; Pulse 83; Resp 20; Temp 98.2; Pulse Ox 95% on R/A;                         kj2 

07:41  / 58; Pulse 60; Resp 16 S; Pulse Ox 97% on R/A; Pain 2/10;                       kc6 

05:46 Body Mass Index 33.47 (108.86 kg, 180.34 cm)                                            lg3 

05:46 Pain Scale: Adult                                                                       lg3 

07:41 Pain Scale: Adult                                                                       kc6 

                                                                                                  

ED Course:                                                                                        

05:31 Patient arrived in ED.                                                                  jj6 

05:47 Triage completed.                                                                       lg3 

05:47 Arm band placed on left wrist.                                                          lg3 

05:49 Inserted saline lock: 22 gauge in right antecubital area, using aseptic technique.      oe  

      Blood collected. Flushed with 10 mL NS.                                                     

05:54 Anthony Galindo MD is Attending Physician.                                             rosa 

06:00 Patient has correct armband on for positive identification. Call light in reach. Side   kj2 

      rails up X 1. Provided Education on: call light.                                            

06:02 Bobbi Manjarrez RN is Primary Nurse.                                                   kj2 

06:30 No provider procedures requiring assistance completed.                                  kj2 

06:49 US Abdomen Limited In Process Unspecified.                                              EDMS

07:00 Report received from LAURE Blandon.                                                       kc6 

07:00 Pulse ox on. NIBP on. Door closed. Noise minimized. Lights dimmed. Pillow given.        kc6 

07:03 Isiah Florez MD is Referral Physician.                                                  rosa 

07:41 IV discontinued, intact, bleeding controlled, No redness/swelling at site. Pressure     kc6 

      dressing applied.                                                                           

                                                                                                  

Administered Medications:                                                                         

06:21 Drug: NS 0.9% IV 1000 ml IV at 1000 ml once; to be given as a bolus over 60 minutes     kj2 

      Route: IV; Rate: 1000 ml; Site: right antecubital;                                          

07:40 Follow up: Response: No adverse reaction; IV Status: Completed infusion; IV Intake:     kc6 

      1000ml                                                                                      

06:22 Drug: GI Cocktail without Donnatal - (Maalox PO 30 ml, Lidocaine Mucous Membrane 2 % 15 kj2 

      ml) PO once Route: PO;                                                                      

07:40 Follow up: Response: No adverse reaction                                                kc6 

06:22 Drug: Pantoprazole IVP 80 mg IVP once Route: IVP; Site: right antecubital;              kj2 

07:40 Follow up: Response: No adverse reaction                                                kc6 

                                                                                                  

                                                                                                  

Medication:                                                                                       

06:30 VIS not applicable for this client.                                                     kj2 

                                                                                                  

Intake:                                                                                           

07:40 IV: 1000ml; Total: 1000ml.                                                              kc6 

                                                                                                  

Outcome:                                                                                          

07:03 Discharge ordered by MD.                                                                rosa 

07:41 Discharged to home ambulatory,                                                          kc6 

07:41 Condition: improved                                                                         

07:41 Discharge instructions given to patient, Instructed on discharge instructions, follow       

      up and referral plans. medication usage, Demonstrated understanding of instructions,        

      follow-up care, medications, Prescriptions given X 3,                                       

07:41 Patient left the ED.                                                                    kc6 

                                                                                                  

Signatures:                                                                                       

Dispatcher [employer]                           EDMS                                                 

Anthony Galindo MD MD cha Espinosa, Orlando oe Able, Lacie, RN                         RN   lg3                                                  

Karina Jones6                                                  

Galina Vaca RN                   RN   kc6                                                  

Bobbi Manjarrez, RN                     RN   kj2                                                  

                                                                                                  

**************************************************************************************************

## 2024-10-14 NOTE — EDPHYS
Physician Documentation                                                                           

 [facility]                                                                 

Name: James Cruz Jr                                                                              

Age: 29 yrs                                                                                       

Sex: Male                                                                                         

: 1995                                                                                   

MRN: [id number]                                                                                   

Arrival Date: 10/14/2024                                                                          

Time: 05:31                                                                                       

Account#: [id number]                                                                            

Bed 15                                                                                            

Private MD:                                                                                       

ED Physician Anthony Galindo                                                                      

HPI:                                                                                              

10/14                                                                                             

06:00 This 29 yrs old Black Male presents to ER via Ambulatory with complaints of Abdominal   rosa 

      Pain.                                                                                       

06:00 The patient presents with abdominal pain in the upper abdomen. Onset: The               rosa 

      symptoms/episode began/occurred 2 day(s) ago. The symptoms do not radiate. Associated       

      signs and symptoms: none. Modifying factors: The symptoms are alleviated by antacids.       

      Severity of pain: At its worst the pain was moderate in the emergency department the        

      pain is unchanged. The patient has experienced similar episodes in the past, multiple       

      times.                                                                                      

                                                                                                  

Historical:                                                                                       

- Allergies:                                                                                      

05:47 Bees;                                                                                   lg3 

05:47 Iodine;                                                                                 lg3 

05:47 SHELLFISH;                                                                              lg3 

- Home Meds:                                                                                      

05:47 None [Active];                                                                          lg3 

- PMHx:                                                                                           

05:47 Asthma;                                                                                 lg3 

- PSHx:                                                                                           

05:47 None;                                                                                   lg3 

                                                                                                  

- Immunization history:: Adult Immunizations up to date.                                          

- Infectious Disease History:: Denies.                                                            

- Social history:: Smoking status: Reported history of juuling and/or vaping.                     

  Patient/guardian denies using alcohol, street drugs.                                            

- Family history:: not pertinent.                                                                 

                                                                                                  

                                                                                                  

ROS:                                                                                              

06:00 Constitutional: Negative for fever, chills, and weight loss, Eyes: Negative for injury, rosa 

      pain, redness, and discharge, ENT: Negative for injury, pain, and discharge, Neck:          

      Negative for injury, pain, and swelling, Cardiovascular: Negative for chest pain,           

      palpitations, and edema, Respiratory: Negative for shortness of breath, cough,              

      wheezing, and pleuritic chest pain, Back: Negative for injury and pain, : Negative        

      for injury, bleeding, discharge, and swelling, MS/Extremity: Negative for injury and        

      deformity, Skin: Negative for injury, rash, and discoloration, Neuro: Negative for          

      headache, weakness, numbness, tingling, and seizure, Psych: Negative for depression,        

      anxiety, suicide ideation, homicidal ideation, and hallucinations, Allergy/Immunology:      

      Negative for hives, rash, and allergies, Endocrine: Negative for neck swelling,             

      polydipsia, polyuria, polyphagia, and marked weight changes, Hematologic/Lymphatic:         

      Negative for swollen nodes, abnormal bleeding, and unusual bruising,                        

06:00 Abdomen/GI: Positive for abdominal pain, of the epigastric area,                            

                                                                                                  

Exam:                                                                                             

06:00 Constitutional:  This is a well developed, well nourished patient who is awake, alert,  rosa 

      and in no acute distress. Head/Face:  Normocephalic, atraumatic. Eyes:  Pupils equal        

      round and reactive to light, extra-ocular motions intact.  Lids and lashes normal.          

      Conjunctiva and sclera are non-icteric and not injected.  Cornea within normal limits.      

      Periorbital areas with no swelling, redness, or edema. ENT:  Nares patent. No nasal         

      discharge, no septal abnormalities noted.  Tympanic membranes are normal and external       

      auditory canals are clear.  Oropharynx with no redness, swelling, or masses, exudates,      

      or evidence of obstruction, uvula midline.  Mucous membranes moist. Neck:  Trachea          

      midline, no thyromegaly or masses palpated, and no cervical lymphadenopathy.  Supple,       

      full range of motion without nuchal rigidity, or vertebral point tenderness.  No            

      Meningismus. Chest/axilla:  Normal chest wall appearance and motion.  Nontender with no     

      deformity.  No lesions are appreciated. Cardiovascular:  Regular rate and rhythm with a     

      normal S1 and S2.  No gallops, murmurs, or rubs.  Normal PMI, no JVD.  No pulse             

      deficits. Respiratory:  Lungs have equal breath sounds bilaterally, clear to                

      auscultation and percussion.  No rales, rhonchi or wheezes noted.  No increased work of     

      breathing, no retractions or nasal flaring. Back:  No spinal tenderness.  No                

      costovertebral tenderness.  Full range of motion. Male :  Normal genitalia with no        

      discharge or lesions. Skin:  Warm, dry with normal turgor.  Normal color with no            

      rashes, no lesions, and no evidence of cellulitis. MS/ Extremity:  Pulses equal, no         

      cyanosis.  Neurovascular intact.  Full, normal range of motion. Neuro:  Awake and           

      alert, GCS 15, oriented to person, place, time, and situation.  Cranial nerves II-XII       

      grossly intact.  Motor strength 5/5 in all extremities.  Sensory grossly intact.            

      Cerebellar exam normal.  Normal gait. Psych:  Awake, alert, with orientation to person,     

      place and time.  Behavior, mood, and affect are within normal limits.                       

06:00 ECG was reviewed by the Attending Physician.                                                

06:00 Abdomen/GI: Inspection: distension, is not seen, Bowel sounds: normal, Palpation: mild      

      abdominal tenderness, in the epigastric area, Liver: no appreciated palpable                

      abnormalities, Hernia: not appreciated,                                                     

06:36 ECG was reviewed by the Attending Physician.                                            [facility] 

                                                                                                  

Vital Signs:                                                                                      

05:46  / 94; Pulse 70; Resp 17 S; Temp 98.3(O); Pulse Ox 98% on R/A; Weight 108.86 kg   lg3 

      (R); Height 5 ft. 11 in. (R); Pain 7/10;                                                    

06:00  / 68; Pulse 83; Resp 20; Temp 98.2; Pulse Ox 95% on R/A;                         kj2 

07:41  / 58; Pulse 60; Resp 16 S; Pulse Ox 97% on R/A; Pain 2/10;                       kc6 

05:46 Body Mass Index 33.47 (108.86 kg, 180.34 cm)                                            lg3 

05:46 Pain Scale: Adult                                                                       lg3 

07:41 Pain Scale: Adult                                                                       kc6 

                                                                                                  

MDM:                                                                                              

05:55 Patient medically screened.                                                             rosa 

06:04 Differential diagnosis: Cholelithiasis, gastritis, gastroesophageal reflux disease,     rosa 

      non-specific abd pain, pancreatitis, Peptic Ulcer Disease. Data reviewed: vital signs,      

      nurses notes, lab test result(s), EKG, radiologic studies, ultrasound. Consideration of     

      Admission/Observation Escalation of care including admission/observation considered. I      

      considered the following discharge prescriptions or medication management in the            

      emergency department Medications were administered in the Emergency Department. See         

      MAR. Independent interpretation of the following test(s) in the Emergency Department        

      EKG: See my EKG interpretation above. Test considered but Not performed: CT: no ct          

      abd/pel. Care significantly affected by the following chronic conditions: asthma.           

                                                                                                  

10/14                                                                                             

06:00 Order name: CBC with Diff; Complete Time: 06:40                                         [facility] 

10/14                                                                                             

06:00 Order name: Comprehensive Metabolic Panel; Complete Time: 06:53                         [facility] 

10/14                                                                                             

06:00 Order name: Lipase; Complete Time: 06:53                                                [facility] 

10/14                                                                                             

06:00 Order name: US Abdomen Limited                                                          [facility] 

10/14                                                                                             

06:00 Order name: EKG - Nurse/Tech; Complete Time: 06:22                                      [facility] 

                                                                                                  

EC:36 Rate is 66 beats/min. Rhythm is regular. QRS Axis is Normal. TN interval is normal. QRS rosa 

      interval is normal. QT interval is normal. No Q waves. T waves are Normal. No ST            

      changes noted. Clinical impression: NSR w/ Non-specific ST/T Changes, LVH, and No           

      evidence of ischemia. Interpreted by me. Reviewed by me.                                    

                                                                                                  

Administered Medications:                                                                         

06:21 Drug: NS 0.9% IV 1000 ml IV at 1000 ml once; to be given as a bolus over 60 minutes     kj2 

      Route: IV; Rate: 1000 ml; Site: right antecubital;                                          

07:40 Follow up: Response: No adverse reaction; IV Status: Completed infusion; IV Intake:     kc6 

      1000ml                                                                                      

06:22 Drug: GI Cocktail without Donnatal - (Maalox PO 30 ml, Lidocaine Mucous Membrane 2 % 15 kj2 

      ml) PO once Route: PO;                                                                      

07:40 Follow up: Response: No adverse reaction                                                kc6 

06:22 Drug: Pantoprazole IVP 80 mg IVP once Route: IVP; Site: right antecubital;              kj2 

07:40 Follow up: Response: No adverse reaction                                                kc6 

                                                                                                  

                                                                                                  

Disposition Summary:                                                                              

10/14/24 07:03                                                                                    

Discharge Ordered                                                                                 

 Notes:       Location: Home                                                                        
  rosa

      Problem: new                                                                            rosa 

      Symptoms: have improved                                                                 rosa 

      Condition: Stable                                                                       rosa 

      Diagnosis                                                                                   

        - Epigastric pain                                                                     rosa 

        - Acute gastritis without bleeding                                                    rosa 

        - Hyperglycemia, unspecified                                                          rosa 

        - Type 2 diabetes mellitus with hyperglycemia - new onset                             rosa 

      Followup:                                                                               rosa 

        - With: Private Physician                                                                  

        - When: 2 - 3 days                                                                         

        - Reason: Recheck today's complaints, Continuance of care, Re-evaluation by your           

      physician                                                                                   

      Followup:                                                                               rosa 

        - With: Isiah Florez MD                                                                    

        - When: 2 - 3 days                                                                         

        - Reason: Recheck today's complaints, Re-evaluation by your physician                      

      Discharge Instructions:                                                                     

        - Discharge Summary Sheet                                                             rosa 

        - Abdominal Pain, Adult                                                               rosa 

        - Type 2 Diabetes Mellitus, Diagnosis, Adult                                          rosa 

        - Food Choices for Gastroesophageal Reflux Disease, Adult                             rosa 

        - Gastritis, Adult                                                                    rosa 

        - Gastroesophageal Reflux Disease, Adult                                              rosa 

        - Hyperglycemia                                                                       rosa 

        - Gastritis, Adult, Easy-to-Read                                                      rosa 

        - Abdominal Pain, Adult, Easy-to-Read                                                 rosa 

        - Gastroesophageal Reflux Disease, Adult, Easy-to-Read                                rosa 

        - Diabetes Mellitus and Nutrition, Adult                                              rosa 

        - Food Choices for Gastroesophageal Reflux Disease, Adult, Easy-to-Read               rosa 

      Forms:                                                                                      

        - Work release form                                                                   bd  

        - Medication Reconciliation Form                                                      rosa 

        - Antibiotic Education                                                                rosa 

        - Prescription Opioid Use                                                             rosa 

        - Patient Portal Instructions                                                         [facility] 

        - Leadership Thank You Letter                                                         [facility] 

      Prescriptions:                                                                              

        - Carafate 1 gram Oral Tablet                                                              

            - take 1 tablet ORAL route 4 times per day take on an empty stomach, beginning on rosa 

      waking and last dose at bedtime; 100 tablet; Refills: 0, Product Selection Permitted        

        - Protonix 40 mg Oral Tablet                                                               

            - take 1 tablet ORAL route once daily; 30 tablet; Refills: 0, Product Selection   [facility] 

      Permitted                                                                                   

        - Metformin 500 mg Oral tablet                                                             

            - take 1 tablet ORAL route once daily for 14 days; 28 tablet; Refills: 0, Product [facility] 

      Selection Permitted                                                                         

Signatures:                                                                                       

Dispatcher [employer]                           [facility]                                                 

Anthony Galindo MD MD cha Able, Lacie, RN                         RN   lg3                                                  

Bobbi Manjarrez RN                     RN   kj2                                                  

Galina Vaca RN   kc6                                                  

                                                                                                  

Corrections: (The following items were deleted from the chart)                                    

06:01 06:01 Abdomen Limited+[id number].RAD.BRZ ordered. [facility]

06:36 06:00 Rate is 70 beats/min. Rhythm is regular. QRS Axis is Normal. TN interval is       rosa 

      normal. QRS interval is normal. QT interval is normal. No Q waves. T waves are Normal.      

      No ST changes noted. Clinical impression: NSR w/ Non-specific ST/T Changes and No           

      evidence of ischemia. Interpreted by me. Reviewed by me. [facility]                                

                                                                                                  

**************************************************************************************************

## 2024-10-14 NOTE — RAD REPORT
EXAM: Right upper quadrant ultrasound.



CLINICAL HISTORY: ABD PAIN



COMPARISON: 4/10/2022                  



FINDINGS:



Gallbladder: Normal.



Bile ducts: No intrahepatic or extrahepatic biliary dilatation.  Common bile duct measures  2 mm.



Limited imaging of the liver shows fatty liver.



IMPRESSION:



Negative gallbladder/biliary tree findings.



Fatty liver.



Reported By: Cj Vega 

Electronically Signed:  10/14/2024 7:35 AM

## 2024-10-17 NOTE — EKG
Test Date:    2024-10-14               Test Time:    06:21:18

Technician:   SHAHRAM                                     

                                                     

MEASUREMENT RESULTS:                                       

Intervals:                                           

Rate:         66                                     

MD:           156                                    

QRSD:         92                                     

QT:           374                                    

QTc:          392                                    

Axis:                                                

P:            28                                     

MD:           156                                    

QRS:          -19                                    

T:            17                                     

                                                     

INTERPRETIVE STATEMENTS:                                       

                                                     

Normal sinus rhythm

Minimal voltage criteria for LVH, may be normal variant

Nonspecific T wave abnormality

Abnormal ECG

Compared to ECG 04/10/2022 11:43:35

T-wave abnormality now present

Sinus arrhythmia no longer present

ST (T wave) deviation no longer present



Electronically Signed On 10-17-24 11:56:29 CDT by Jorje Mota